# Patient Record
Sex: FEMALE | Race: WHITE | NOT HISPANIC OR LATINO
[De-identification: names, ages, dates, MRNs, and addresses within clinical notes are randomized per-mention and may not be internally consistent; named-entity substitution may affect disease eponyms.]

---

## 2020-10-28 PROBLEM — Z00.00 ENCOUNTER FOR PREVENTIVE HEALTH EXAMINATION: Status: ACTIVE | Noted: 2020-10-28

## 2022-11-23 ENCOUNTER — APPOINTMENT (OUTPATIENT)
Dept: RHEUMATOLOGY | Facility: CLINIC | Age: 52
End: 2022-11-23

## 2022-11-23 ENCOUNTER — NON-APPOINTMENT (OUTPATIENT)
Age: 52
End: 2022-11-23

## 2022-11-23 VITALS
OXYGEN SATURATION: 96 % | TEMPERATURE: 97.3 F | HEIGHT: 63.5 IN | WEIGHT: 190 LBS | HEART RATE: 72 BPM | BODY MASS INDEX: 33.25 KG/M2

## 2022-11-23 DIAGNOSIS — M25.50 PAIN IN UNSPECIFIED JOINT: ICD-10-CM

## 2022-11-23 DIAGNOSIS — M35.00 SICCA SYNDROME, UNSPECIFIED: ICD-10-CM

## 2022-11-23 PROCEDURE — 36415 COLL VENOUS BLD VENIPUNCTURE: CPT

## 2022-11-23 PROCEDURE — 99204 OFFICE O/P NEW MOD 45 MIN: CPT | Mod: 25

## 2022-11-23 RX ORDER — METHYLPREDNISOLONE 4 MG/1
4 TABLET ORAL
Qty: 1 | Refills: 1 | Status: ACTIVE | COMMUNITY
Start: 2022-11-23 | End: 1900-01-01

## 2022-11-23 NOTE — ASSESSMENT
[FreeTextEntry1] : 51 y/o female w/ Graves disease s/p thyroidectomy referred to rheumatology for joint pains and nodules on fingers. \par Reports pains in the b/l shoulders with difficulty raising shoulders.\par Reports numbness/tingling of b/l hands and b/l feet. Pt had episode of R wrist pain last year which self-resolved.\par Pt has had MR of L-spine ~5 years ago which showed disc disease with nerve compression. Pt had NCV at the time which showed LE nerve damage. Pt had PT only for 3-4 times for L-spine in past.\par Reports swelling of b/l hands.\par Pt reports many stomach issues, follows with GI with colonoscopy and endoscopy  in 3/2021 showing acute duodenitis, IBS, but no IBD\par Pt reports dry eyes, nose with nosebleeds.\par Pt has not had any imaging done recently.\par Pt takes oxycodone, muscle relaxants, Advil PRN for neurology.\par Episode raised, pruritic rash of chest that resolved spontaneously.\par Reports dry eyes/mouth with Hx of swollen parotid glands. Pt has dentures.\par Family Hx of Graves disease\par \par Pt has various joint pains with sicca symptoms. Pt's joint pains are likely related to b/l hand OA with Heberden's nodes, L>R rotator cuff tendinitis, and C-spine and L-spine OA, disc diseases with nerve compression (L-spine disease was already known 5 years ago).\par Given that pt's joint pains are likely mechanical and not inflammatory, low concern for underlying autoimmune rheum diseases.\par \par - Obtain labwork to evaluate for signs of autoimmune inflammatory arthritis\par - Obtain XR b/l hands, wrists, shoulders, hips, C-spine, L-spine\par - Discussed with patient at length about treatment of OA and soft tissue injuries including oral/topical analgesics, pain therapies (yoga, tate chi, acupuncture, chiropractor, massage therapy, wax therapy, etc.), PT/OT, steroid injections, surgeries. Advised on healthy diet, exercise, smoking avoidance, weight loss, stress management, sleep hygiene, control of comorbidities to help with management of pain and improve daily function.\par - Rx medrol pack for short term relief of L shoulder RCT.\par - Depending on above results and response to conservative therapy, pt can be considered for steroid injection (L shoulder), MRIs, other NSAIDs (celebrex since other NSAIDs give pt stomach upset)\par - RTC 6 weeks for follow up\par \par

## 2022-11-23 NOTE — HISTORY OF PRESENT ILLNESS
[FreeTextEntry1] : 53 y/o female w/ Graves disease s/p thyroidectomy referred to rheumatology for joint pains and nodules on fingers. \par Reports pains in the b/l shoulders with difficulty raising shoulders.\par Reports numbness/tingling of b/l hands and b/l feet. Pt had episode of R wrist pain last year which self-resolved.\par Pt has had MR of L-spine ~5 years ago which showed disc disease with nerve compression. Pt had NCV at the time which showed LE nerve damage. Pt had PT only for 3-4 times for L-spine in past.\par Reports swelling of b/l hands.\par Pt reports many stomach issues, follows with GI with colonoscopy and endoscopy  in 3/2021 showing acute duodenitis, IBS, but no IBD\par Pt reports dry eyes, nose with nosebleeds.\par Pt has not had any imaging done recently.\par Pt takes oxycodone, muscle relaxants, Advil PRN for neurology.\par Episode raised, pruritic rash of chest that resolved spontaneously.\par Reports dry eyes/mouth with Hx of swollen parotid glands. Pt has dentures.\par \par Patient denies fever, nasopharyngeal ulcers, chest pain, abdominal pain, cough, SOB, photosensitivity, Raynaud's, alopecia, miscarriages, Hx of DVT/PEs.\par ROS negative unless otherwise noted above.\par \par Family Hx of Graves disease\par \par \par

## 2022-11-24 LAB
ALBUMIN SERPL ELPH-MCNC: 4.4 G/DL
ALP BLD-CCNC: 92 U/L
ALT SERPL-CCNC: 26 U/L
ANION GAP SERPL CALC-SCNC: 12 MMOL/L
AST SERPL-CCNC: 20 U/L
BASOPHILS # BLD AUTO: 0.08 K/UL
BASOPHILS NFR BLD AUTO: 0.9 %
BILIRUB SERPL-MCNC: <0.2 MG/DL
BUN SERPL-MCNC: 31 MG/DL
CALCIUM SERPL-MCNC: 9.5 MG/DL
CHLORIDE SERPL-SCNC: 104 MMOL/L
CO2 SERPL-SCNC: 26 MMOL/L
CREAT SERPL-MCNC: 1.18 MG/DL
CRP SERPL-MCNC: 4 MG/L
EGFR: 55 ML/MIN/1.73M2
EOSINOPHIL # BLD AUTO: 0.29 K/UL
EOSINOPHIL NFR BLD AUTO: 3.1 %
ERYTHROCYTE [SEDIMENTATION RATE] IN BLOOD BY WESTERGREN METHOD: 33 MM/HR
GLUCOSE SERPL-MCNC: 91 MG/DL
HCT VFR BLD CALC: 44.4 %
HGB BLD-MCNC: 14.6 G/DL
IMM GRANULOCYTES NFR BLD AUTO: 0.2 %
LYMPHOCYTES # BLD AUTO: 3.42 K/UL
LYMPHOCYTES NFR BLD AUTO: 37.1 %
MAN DIFF?: NORMAL
MCHC RBC-ENTMCNC: 27.6 PG
MCHC RBC-ENTMCNC: 32.9 GM/DL
MCV RBC AUTO: 83.9 FL
MONOCYTES # BLD AUTO: 0.8 K/UL
MONOCYTES NFR BLD AUTO: 8.7 %
NEUTROPHILS # BLD AUTO: 4.62 K/UL
NEUTROPHILS NFR BLD AUTO: 50 %
PLATELET # BLD AUTO: 347 K/UL
POTASSIUM SERPL-SCNC: 4.4 MMOL/L
PROT SERPL-MCNC: 6.9 G/DL
RBC # BLD: 5.29 M/UL
RBC # FLD: 13.9 %
RHEUMATOID FACT SER QL: <10 IU/ML
SODIUM SERPL-SCNC: 142 MMOL/L
WBC # FLD AUTO: 9.23 K/UL

## 2022-11-26 LAB
C3 SERPL-MCNC: 186 MG/DL
C4 SERPL-MCNC: 30 MG/DL
CCP AB SER IA-ACNC: <8 UNITS
DSDNA AB SER-ACNC: <12 IU/ML
ENA RNP AB SER IA-ACNC: 0.2 AL
ENA SM AB SER IA-ACNC: <0.2 AL
ENA SS-A AB SER IA-ACNC: <0.2 AL
ENA SS-B AB SER IA-ACNC: <0.2 AL
RF+CCP IGG SER-IMP: NEGATIVE
THYROGLOB AB SERPL-ACNC: <20 IU/ML
THYROPEROXIDASE AB SERPL IA-ACNC: 33.5 IU/ML

## 2022-11-29 LAB
ANA PAT FLD IF-IMP: ABNORMAL
ANA SER IF-ACNC: ABNORMAL

## 2022-12-02 LAB — 14-3-3 ETA AG SER IA-MCNC: <0.2 NG/ML

## 2023-01-04 ENCOUNTER — APPOINTMENT (OUTPATIENT)
Dept: RHEUMATOLOGY | Facility: CLINIC | Age: 53
End: 2023-01-04

## 2024-06-27 ENCOUNTER — APPOINTMENT (OUTPATIENT)
Dept: DERMATOLOGY | Facility: CLINIC | Age: 54
End: 2024-06-27